# Patient Record
Sex: FEMALE | URBAN - METROPOLITAN AREA
[De-identification: names, ages, dates, MRNs, and addresses within clinical notes are randomized per-mention and may not be internally consistent; named-entity substitution may affect disease eponyms.]

---

## 2024-03-18 ENCOUNTER — OFFICE VISIT (OUTPATIENT)
Age: 79
End: 2024-03-18
Payer: COMMERCIAL

## 2024-03-18 VITALS
HEIGHT: 66 IN | BODY MASS INDEX: 29.73 KG/M2 | WEIGHT: 185 LBS | HEART RATE: 62 BPM | DIASTOLIC BLOOD PRESSURE: 88 MMHG | RESPIRATION RATE: 17 BRPM | OXYGEN SATURATION: 97 % | SYSTOLIC BLOOD PRESSURE: 150 MMHG

## 2024-03-18 DIAGNOSIS — H92.01 OTALGIA, RIGHT: Primary | ICD-10-CM

## 2024-03-18 DIAGNOSIS — H90.3 SENSORINEURAL HEARING LOSS (SNHL), BILATERAL: ICD-10-CM

## 2024-03-18 PROCEDURE — 1123F ACP DISCUSS/DSCN MKR DOCD: CPT | Performed by: OTOLARYNGOLOGY

## 2024-03-18 PROCEDURE — 99203 OFFICE O/P NEW LOW 30 MIN: CPT | Performed by: OTOLARYNGOLOGY

## 2024-03-18 RX ORDER — CYCLOPENTOLATE HYDROCHLORIDE 10 MG/ML
SOLUTION/ DROPS OPHTHALMIC
COMMUNITY

## 2024-03-18 RX ORDER — KETOROLAC TROMETHAMINE 5 MG/ML
SOLUTION OPHTHALMIC
COMMUNITY

## 2024-03-18 RX ORDER — PREDNISOLONE ACETATE 10 MG/ML
SUSPENSION/ DROPS OPHTHALMIC
COMMUNITY

## 2024-03-18 RX ORDER — BROMFENAC SODIUM 0.81 MG/ML
SOLUTION/ DROPS OPHTHALMIC
COMMUNITY

## 2024-03-18 NOTE — PROGRESS NOTES
Otolaryngology-Head and Neck Surgery  New Patient Visit     Patient: Josey Cortes  YOB: 1945  MRN: [id number]  Date of Service: 3/18/2024    Chief Complaint:   Chief Complaint   Patient presents with    New Patient     Right ear problem    Dizziness         History of Present Illness: Josey Cortes is a 78 y.o. female who presents today for discussion of her ears    Describes chronic intermittent right otalgia  Symptoms come and go, generally mild    Does also have chronic intermittent vertigo    Had prior audiogram about 2 years ago showing bilateral sensorineural hearing loss  Does have hearing aids but was not sure about using them as she feels her ears are sensitive when covered    Past Medical History:  History reviewed. No pertinent past medical history.    Past Surgical History:   Past Surgical History:   Procedure Laterality Date    CATARACT REMOVAL      CHOLECYSTECTOMY         Medications:   Current Outpatient Medications   Medication Instructions    bromfenac (PROLENSA) 0.07 % SOLN 1 drop into RIGHT EYE, START 3 DAYS PRIOR TO SURGERY Ophthalmic Once a day    cyclopentolate (CYCLOGYL) 1 % ophthalmic solution Take 1 drop in the RIGHT eye every 5 minutes for a total of 3 drops on the MORNING OF YOUR SURGERY PRIOR TO LEAVING THE HOUSE Ophthalmic    ketorolac (ACULAR) 0.5 % ophthalmic solution 1 drop into affected eye  as needed Ophthalmic Four times a day    metoprolol tartrate (LOPRESSOR) 25 MG tablet 1 tablet Twice a day for 0 days    prednisoLONE acetate (PRED FORTE) 1 % ophthalmic suspension 1 drop into RIGHT EYE, start AFTER SURGERY Ophthalmic Four times daily x 1 week, Three times daily x 1 week, two times daily x 1 week, Once daily x 1 week then stop for 30 days       Allergies:   No Known Allergies    Social History:   Social History     Tobacco Use    Smoking status: Never    Smokeless tobacco: Never   Vaping Use    Vaping Use: Never used        Family History:  History reviewed. No

## 2025-06-19 ENCOUNTER — HOSPITAL ENCOUNTER (OUTPATIENT)
Facility: HOSPITAL | Age: 80
End: 2025-06-19
Payer: MEDICARE

## 2025-06-19 VITALS
RESPIRATION RATE: 18 BRPM | SYSTOLIC BLOOD PRESSURE: 236 MMHG | HEART RATE: 79 BPM | BODY MASS INDEX: 30.12 KG/M2 | DIASTOLIC BLOOD PRESSURE: 111 MMHG | TEMPERATURE: 97.4 F | OXYGEN SATURATION: 97 % | WEIGHT: 186.6 LBS

## 2025-06-19 DIAGNOSIS — Z17.0 MALIGNANT NEOPLASM OF LOWER-OUTER QUADRANT OF LEFT BREAST OF FEMALE, ESTROGEN RECEPTOR POSITIVE (HCC): Primary | ICD-10-CM

## 2025-06-19 DIAGNOSIS — C50.512 MALIGNANT NEOPLASM OF LOWER-OUTER QUADRANT OF LEFT BREAST OF FEMALE, ESTROGEN RECEPTOR POSITIVE (HCC): Primary | ICD-10-CM

## 2025-06-19 PROCEDURE — 99202 OFFICE O/P NEW SF 15 MIN: CPT

## 2025-06-19 RX ORDER — CHOLECALCIFEROL (VITAMIN D3) 1250 MCG
1 CAPSULE ORAL DAILY
COMMUNITY

## 2025-06-19 ASSESSMENT — PAIN SCALES - GENERAL: PAINLEVEL_OUTOF10: 0

## 2025-06-19 NOTE — PROGRESS NOTES
[place], [place]    Radiation Oncology Consultation    Josey Cortes  [id number]  1945     Diagnosis   1.  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  1 mm margin anteriorly.  Oncotype 7.    AJCC Staging has been reviewed.  History of Present Illness   Ms. Cortes is a 79 y.o. female seen in consultation at the request of Dr. Tong to assess the role of radiation for her above diagnosis.    her oncologic history is detailed below:  Patient was initially diagnosed with left breast IDC in 2025 when biopsy of a screening detected mass revealed IDC, grade 1, ER positive, WA positive.  She had left lumpectomy and sentinel lymph node biopsy May 23, 2025.  Final path reviewed below.    She has yet to meet with medical oncology.  Her Oncotype is 7.    GYN/Female History:  Menarche/Menopause:   (twins)  OCPs use - 0 years  Hormone Replacement Therapy: No  Fertility Drugs: No    Pathology:  Lumpectomy         Symptomatically, she denies any pain or other complaints at this time.  From a performance status standpoint, she is able to perform her ADLs. Ms. Cortes denies a history of inflammatory bowel disease, scleroderma or other collagen vascular diseases, pacemaker/AICD, or history of prior irradiation.    Review of Systems:  A complete review of systems was obtained and negative except as described above.    Allergies and Medications   No Known Allergies    Current Outpatient Medications   Medication Instructions    bromfenac (PROLENSA) 0.07 % SOLN 1 drop into RIGHT EYE, START 3 DAYS PRIOR TO SURGERY Ophthalmic Once a day    Cholecalciferol (VITAMIN D3) 1.25 MG (90313 UT) CAPS 1 capsule, DAILY    cyclopentolate (CYCLOGYL) 1 % ophthalmic solution Take 1 drop in the RIGHT eye every 5 minutes for a total of 3 drops on the MORNING OF YOUR SURGERY PRIOR TO LEAVING THE HOUSE Ophthalmic    ketorolac (ACULAR) 0.5 % ophthalmic solution 1 drop into

## 2025-06-20 ENCOUNTER — CLINICAL DOCUMENTATION (OUTPATIENT)
Facility: HOSPITAL | Age: 80
End: 2025-06-20

## 2025-06-20 NOTE — PROGRESS NOTES
NCCN Distress Thermometer    Radiation Oncology at [facility]    Date Screening Completed: 6/20/2025    Screening Declined:  [] Yes    Number that best describes how much distress you've experienced in the past week, including today?  0 [] - No distress 1 [x]      2 []      3 []      4 []       5 []       6 []      7 []      8 []      9 []       10 [] - Extreme distress    PROBLEM LIST  Have you had concerns about any of the items below in the past week, including today?      Physical Concerns Practical Concerns   [] Pain [] Taking care of myself    [] Sleep [] Taking care of others    [] Fatigue [] Safety   [] Tobacco use  [] Work   [] Substance use  [] School   [] Memory or concentration [] Housing/Utilities   [] Sexual health [] Finances   [] Changes in eating  [] Insurance   [] Loss or change of physical abilities  [] Transportation    []    Emotional Concerns [] Having enough food   [] Worry or anxiety [] Access to medicine   [] Sadness or depression [] Treatment decisions   [] Loss of interest or enjoyment     [] Grief or loss  Spiritual or Christianity Concerns   [] Fear [] Sense of meaning or purpose   [] Loneliness  [] Changes in salvatore or beliefs   [] Anger [] Death, dying, or afterlife   [] Changes in appearance [] Conflict between beliefs and cancer treatments    [] Feelings of worthlessness or being a burden [] Relationship with the sacred    [] Ritual or dietary needs    Social Concerns     [] Relationship with spouse or partner     [] Relationship with children    [] Relationship with family members     [] Relationship with friends or coworkers     [] Communication with health care team     [] Ability to have children     [] Prejudice or discrimination        Other Concerns: DI

## 2025-07-17 ENCOUNTER — HOSPITAL ENCOUNTER (OUTPATIENT)
Facility: HOSPITAL | Age: 80
End: 2025-07-17
Payer: MEDICARE

## 2025-07-17 VITALS
DIASTOLIC BLOOD PRESSURE: 89 MMHG | RESPIRATION RATE: 18 BRPM | SYSTOLIC BLOOD PRESSURE: 211 MMHG | TEMPERATURE: 97.4 F | HEART RATE: 76 BPM | OXYGEN SATURATION: 97 %

## 2025-07-17 DIAGNOSIS — C50.512 MALIGNANT NEOPLASM OF LOWER-OUTER QUADRANT OF LEFT BREAST OF FEMALE, ESTROGEN RECEPTOR POSITIVE (HCC): Primary | ICD-10-CM

## 2025-07-17 DIAGNOSIS — Z17.0 MALIGNANT NEOPLASM OF LOWER-OUTER QUADRANT OF LEFT BREAST OF FEMALE, ESTROGEN RECEPTOR POSITIVE (HCC): Primary | ICD-10-CM

## 2025-07-17 PROCEDURE — 99212 OFFICE O/P EST SF 10 MIN: CPT

## 2025-07-17 ASSESSMENT — PAIN SCALES - GENERAL: PAINLEVEL_OUTOF10: 0

## 2025-07-17 NOTE — PROGRESS NOTES
[facility]    Radiation Oncology Follow Up    Josey Cortes  [id number]  1945     Diagnosis   1.  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  1 mm margin anteriorly.  Oncotype 7.    AJCC Staging has been reviewed.  Oncologic History   No treatment started adjuvantly yet.  Surgery was 5/23/25    Interval History   Ms. Cortes arrives today for routine follow up 1 month from our last visit.    Since her last visit Ms. Cortes has decided she will proceed without radiation.  She is going to be starting hormone therapy soon.  She denies any new complaints at this time.  She feels well overall, no breast related complaints.    Review of Systems:  A complete review of systems was obtained and negative except as described above.    Interval Imaging/Labs   No new imaging to review.    Allergies and Medications   No Known Allergies    Current Outpatient Medications   Medication Instructions    bromfenac (PROLENSA) 0.07 % SOLN 1 drop into RIGHT EYE, START 3 DAYS PRIOR TO SURGERY Ophthalmic Once a day    Cholecalciferol (VITAMIN D3) 1.25 MG (16723 UT) CAPS 1 capsule, DAILY    cyclopentolate (CYCLOGYL) 1 % ophthalmic solution Take 1 drop in the RIGHT eye every 5 minutes for a total of 3 drops on the MORNING OF YOUR SURGERY PRIOR TO LEAVING THE HOUSE Ophthalmic    ketorolac (ACULAR) 0.5 % ophthalmic solution 1 drop into affected eye  as needed Ophthalmic Four times a day    metoprolol tartrate (LOPRESSOR) 25 MG tablet 1 tablet Twice a day for 0 days    prednisoLONE acetate (PRED FORTE) 1 % ophthalmic suspension 1 drop into RIGHT EYE, start AFTER SURGERY Ophthalmic Four times daily x 1 week, Three times daily x 1 week, two times daily x 1 week, Once daily x 1 week then stop for 30 days        Physical Exam:   Vitals: Blood pressure (!) 211/89, pulse 76, temperature 97.4 °F (36.3 °C), temperature source Temporal, resp. rate 18, SpO2 97%.